# Patient Record
Sex: FEMALE | Race: WHITE | URBAN - METROPOLITAN AREA
[De-identification: names, ages, dates, MRNs, and addresses within clinical notes are randomized per-mention and may not be internally consistent; named-entity substitution may affect disease eponyms.]

---

## 2021-09-30 ENCOUNTER — LAB OUTSIDE AN ENCOUNTER (OUTPATIENT)
Dept: URBAN - METROPOLITAN AREA CLINIC 105 | Facility: CLINIC | Age: 26
End: 2021-09-30

## 2021-09-30 ENCOUNTER — WEB ENCOUNTER (OUTPATIENT)
Dept: URBAN - METROPOLITAN AREA CLINIC 105 | Facility: CLINIC | Age: 26
End: 2021-09-30

## 2021-09-30 ENCOUNTER — OFFICE VISIT (OUTPATIENT)
Dept: URBAN - METROPOLITAN AREA CLINIC 105 | Facility: CLINIC | Age: 26
End: 2021-09-30
Payer: COMMERCIAL

## 2021-09-30 DIAGNOSIS — K62.89 ANAL PAIN: ICD-10-CM

## 2021-09-30 DIAGNOSIS — Z87.19 HISTORY OF SMALL BOWEL OBSTRUCTION: ICD-10-CM

## 2021-09-30 DIAGNOSIS — K60.5 ANORECTAL FISTULA: ICD-10-CM

## 2021-09-30 DIAGNOSIS — K50.919 CROHN'S DISEASE WITH COMPLICATION, UNSPECIFIED GASTROINTESTINAL TRACT LOCATION: ICD-10-CM

## 2021-09-30 PROCEDURE — 99204 OFFICE O/P NEW MOD 45 MIN: CPT | Performed by: INTERNAL MEDICINE

## 2021-09-30 RX ORDER — HYDROCORTISONE ACETATE 25 MG/1
1 SUPPOSITORY SUPPOSITORY RECTAL
Qty: 10 | Refills: 0 | OUTPATIENT
Start: 2021-09-30 | End: 2021-10-10

## 2021-09-30 NOTE — PHYSICAL EXAM GASTROINTESTINAL
Abdomen non distended.  RECTAL EXAM - no external abnormality; digit exam - canal non-tender, no palpable abnormality
Need for Mobility Assisted Device

## 2021-09-30 NOTE — HPI-TODAY'S VISIT:
The patient presents for further management of her Crohn's Disease.  Today, the patient says she was diagnosed with Crohn's disease at 6 years old in [place], [place]. At the time, she was put on steroids then Remicade, but has been off any treatment for the past 2-3 years - last saw a GI 2 years ago for a consultation when she moved to [facility]. The patient says the GI recommended she proceed with a colon; however, she never followed through due to being preoccupied with other things - last colon was about 4 years ago in [place], [place] where she also last received Remicade. She was on Remicade every 6 weeks from the age of 9 to 20, but she started to have joint pain. Diet change and exercise improved joint discomfort, so she decided to d/c Remicade and see how she felt. Joint pain resolved after d/c Remicade. She has overall been asymptomatic for the past 2 years, but notes onset of anal pain and a hard protrusion that she thinks could be related to her Crohns. She does note more stress recently.  Her GI in [place] was Thang Thomas, who she followed with from 1969-1101. Her GI in [place], [place] was Dr. Chaparro Dickinson at [facility]. She had a bowel obstruction in [place] in 2016/17 - hospitalized for 2-3 days, given IV steroids for resolution. She was on Remicade at the time of her obstruction. PSH: Fistulotomy in [place], [place] shortly after Crohns diagnosis, age 6. Trigger finger surgery. No medication. Social History: No alcohol, tobacco, or recreational drug use. FH: Mother has IBS.

## 2021-10-22 ENCOUNTER — OFFICE VISIT (OUTPATIENT)
Dept: URBAN - METROPOLITAN AREA CLINIC 96 | Facility: CLINIC | Age: 26
End: 2021-10-22
Payer: COMMERCIAL

## 2021-10-22 ENCOUNTER — TELEPHONE ENCOUNTER (OUTPATIENT)
Dept: URBAN - METROPOLITAN AREA CLINIC 92 | Facility: CLINIC | Age: 26
End: 2021-10-22

## 2021-10-22 ENCOUNTER — DASHBOARD ENCOUNTERS (OUTPATIENT)
Age: 26
End: 2021-10-22

## 2021-10-22 DIAGNOSIS — K62.89 ANAL PAIN: ICD-10-CM

## 2021-10-22 DIAGNOSIS — K50.919 CROHN'S DISEASE WITH COMPLICATION, UNSPECIFIED GASTROINTESTINAL TRACT LOCATION: ICD-10-CM

## 2021-10-22 DIAGNOSIS — Z87.19 HISTORY OF SMALL BOWEL OBSTRUCTION: ICD-10-CM

## 2021-10-22 DIAGNOSIS — K60.5 ANORECTAL FISTULA: ICD-10-CM

## 2021-10-22 PROCEDURE — 99215 OFFICE O/P EST HI 40 MIN: CPT | Performed by: INTERNAL MEDICINE

## 2021-10-22 RX ORDER — LEVOFLOXACIN 500 MG
1 TABLET TABLET ORAL ONCE A DAY
Qty: 10 | OUTPATIENT
Start: 2021-10-22 | End: 2021-11-01

## 2021-10-22 RX ORDER — METRONIDAZOLE 500 MG/1
1 TABLET TABLET ORAL THREE TIMES A DAY
Qty: 30 | OUTPATIENT
Start: 2021-10-22 | End: 2021-11-01

## 2021-10-22 NOTE — HPI-TODAY'S VISIT:
here to try to establish care. was having a lot of anal pain. wonders if she has a fistula. was to get a colonoscopy but wants to reschedule. was to try suppositories but did not get them. not been on therapy for years, just clean eating GFD  had a fistula with her intial diagnosis  hx of perianal fistula s/p surgical repair. was on remicade but stopped at age 20 b/c of joint pain. trigger finger and had to have release.  ============================================= The patient presents for further management of her Crohn's Disease.  Today, the patient says she was diagnosed with Crohn's disease at 6 years old in [place], [place]. At the time, she was put on steroids then Remicade, but has been off any treatment for the past 2-3 years - last saw a GI 2 years ago for a consultation when she moved to [facility]. The patient says the GI recommended she proceed with a colon; however, she never followed through due to being preoccupied with other things - last colon was about 4 years ago in [place], [place] where she also last received Remicade. She was on Remicade every 6 weeks from the age of 9 to 20, but she started to have joint pain. Diet change and exercise improved joint discomfort, so she decided to d/c Remicade and see how she felt. Joint pain resolved after d/c Remicade. She has overall been asymptomatic for the past 2 years, but notes onset of anal pain and a hard protrusion that she thinks could be related to her Crohns. She does note more stress recently.  Her GI in [place] was Thang Thomas, who she followed with from [id number]. Her GI in [place], [place] was Dr. Chaparro Dickinson at [facility]. She had a bowel obstruction in [place] in 2016/17 - hospitalized for 2-3 days, given IV steroids for resolution. She was on Remicade at the time of her obstruction. PSH: Fistulotomy in [place], [place] shortly after Crohns diagnosis, age 6. Trigger finger surgery. No medication. Social History: No alcohol, tobacco, or recreational drug use. FH: Mother has IBS.

## 2021-10-28 PROBLEM — 34000006: Status: ACTIVE | Noted: 2021-09-30

## 2021-11-08 ENCOUNTER — OFFICE VISIT (OUTPATIENT)
Dept: URBAN - METROPOLITAN AREA SURGERY CENTER 16 | Facility: SURGERY CENTER | Age: 26
End: 2021-11-08

## 2021-11-24 ENCOUNTER — OFFICE VISIT (OUTPATIENT)
Dept: URBAN - METROPOLITAN AREA SURGERY CENTER 18 | Facility: SURGERY CENTER | Age: 26
End: 2021-11-24
Payer: COMMERCIAL

## 2021-11-24 ENCOUNTER — TELEPHONE ENCOUNTER (OUTPATIENT)
Dept: URBAN - METROPOLITAN AREA CLINIC 92 | Facility: CLINIC | Age: 26
End: 2021-11-24

## 2021-11-24 ENCOUNTER — CLAIMS CREATED FROM THE CLAIM WINDOW (OUTPATIENT)
Dept: URBAN - METROPOLITAN AREA CLINIC 4 | Facility: CLINIC | Age: 26
End: 2021-11-24
Payer: COMMERCIAL

## 2021-11-24 DIAGNOSIS — K63.89 POLYP, HYPERPLASTIC: ICD-10-CM

## 2021-11-24 DIAGNOSIS — K50.00 CROHN'S DISEASE OF SMALL INTESTINE WITHOUT COMPLICATIONS: ICD-10-CM

## 2021-11-24 DIAGNOSIS — K50.00 CICATRIZING ENTEROCOLITIS: ICD-10-CM

## 2021-11-24 PROBLEM — 56689002: Status: ACTIVE | Noted: 2021-11-24

## 2021-11-24 PROCEDURE — G8907 PT DOC NO EVENTS ON DISCHARG: HCPCS | Performed by: INTERNAL MEDICINE

## 2021-11-24 PROCEDURE — 88305 TISSUE EXAM BY PATHOLOGIST: CPT | Performed by: PATHOLOGY

## 2021-11-24 PROCEDURE — 45380 COLONOSCOPY AND BIOPSY: CPT | Performed by: INTERNAL MEDICINE

## 2021-12-11 ENCOUNTER — TELEPHONE ENCOUNTER (OUTPATIENT)
Dept: URBAN - METROPOLITAN AREA CLINIC 92 | Facility: CLINIC | Age: 26
End: 2021-12-11